# Patient Record
Sex: MALE | Race: BLACK OR AFRICAN AMERICAN
[De-identification: names, ages, dates, MRNs, and addresses within clinical notes are randomized per-mention and may not be internally consistent; named-entity substitution may affect disease eponyms.]

---

## 2017-02-11 ENCOUNTER — HOSPITAL ENCOUNTER (OUTPATIENT)
Dept: HOSPITAL 47 - EC | Age: 47
Setting detail: OBSERVATION
LOS: 1 days | Discharge: LEFT BEFORE DISCHARGE | End: 2017-02-12
Payer: COMMERCIAL

## 2017-02-11 VITALS — BODY MASS INDEX: 46 KG/M2

## 2017-02-11 DIAGNOSIS — I16.0: Primary | ICD-10-CM

## 2017-02-11 DIAGNOSIS — R74.8: ICD-10-CM

## 2017-02-11 DIAGNOSIS — E66.9: ICD-10-CM

## 2017-02-11 DIAGNOSIS — Z79.899: ICD-10-CM

## 2017-02-11 DIAGNOSIS — Z88.5: ICD-10-CM

## 2017-02-11 DIAGNOSIS — I10: ICD-10-CM

## 2017-02-11 DIAGNOSIS — Z87.891: ICD-10-CM

## 2017-02-11 PROCEDURE — 36415 COLL VENOUS BLD VENIPUNCTURE: CPT

## 2017-02-11 PROCEDURE — 83880 ASSAY OF NATRIURETIC PEPTIDE: CPT

## 2017-02-11 PROCEDURE — 85025 COMPLETE CBC W/AUTO DIFF WBC: CPT

## 2017-02-11 PROCEDURE — 83735 ASSAY OF MAGNESIUM: CPT

## 2017-02-11 PROCEDURE — 82553 CREATINE MB FRACTION: CPT

## 2017-02-11 PROCEDURE — 96375 TX/PRO/DX INJ NEW DRUG ADDON: CPT

## 2017-02-11 PROCEDURE — 93005 ELECTROCARDIOGRAM TRACING: CPT

## 2017-02-11 PROCEDURE — 71020: CPT

## 2017-02-11 PROCEDURE — 85730 THROMBOPLASTIN TIME PARTIAL: CPT

## 2017-02-11 PROCEDURE — 82550 ASSAY OF CK (CPK): CPT

## 2017-02-11 PROCEDURE — 96374 THER/PROPH/DIAG INJ IV PUSH: CPT

## 2017-02-11 PROCEDURE — 96376 TX/PRO/DX INJ SAME DRUG ADON: CPT

## 2017-02-11 PROCEDURE — 93306 TTE W/DOPPLER COMPLETE: CPT

## 2017-02-11 PROCEDURE — 99291 CRITICAL CARE FIRST HOUR: CPT

## 2017-02-11 PROCEDURE — 85379 FIBRIN DEGRADATION QUANT: CPT

## 2017-02-11 PROCEDURE — 85610 PROTHROMBIN TIME: CPT

## 2017-02-11 PROCEDURE — 84484 ASSAY OF TROPONIN QUANT: CPT

## 2017-02-11 PROCEDURE — 96361 HYDRATE IV INFUSION ADD-ON: CPT

## 2017-02-11 PROCEDURE — 80053 COMPREHEN METABOLIC PANEL: CPT

## 2017-02-12 VITALS — RESPIRATION RATE: 18 BRPM

## 2017-02-12 VITALS — TEMPERATURE: 98.5 F | HEART RATE: 85 BPM | SYSTOLIC BLOOD PRESSURE: 153 MMHG | DIASTOLIC BLOOD PRESSURE: 83 MMHG

## 2017-02-12 LAB
ALP SERPL-CCNC: 69 U/L (ref 38–126)
ALT SERPL-CCNC: 28 U/L (ref 21–72)
ANION GAP SERPL CALC-SCNC: 10 MMOL/L
APTT BLD: 25.1 SEC (ref 22–30)
AST SERPL-CCNC: 22 U/L (ref 17–59)
BASOPHILS # BLD AUTO: 0 K/UL (ref 0–0.2)
BASOPHILS NFR BLD AUTO: 0 %
BUN SERPL-SCNC: 15 MG/DL (ref 9–20)
CALCIUM SPEC-MCNC: 9.1 MG/DL (ref 8.4–10.2)
CH: 30
CHCM: 35.1
CHLORIDE SERPL-SCNC: 102 MMOL/L (ref 98–107)
CK SERPL-CCNC: 150 U/L (ref 55–170)
CK SERPL-CCNC: 150 U/L (ref 55–170)
CK SERPL-CCNC: 228 U/L (ref 55–170)
CO2 SERPL-SCNC: 28 MMOL/L (ref 22–30)
EOSINOPHIL # BLD AUTO: 0.2 K/UL (ref 0–0.7)
EOSINOPHIL NFR BLD AUTO: 2 %
ERYTHROCYTE [DISTWIDTH] IN BLOOD BY AUTOMATED COUNT: 5.29 M/UL (ref 4.3–5.9)
ERYTHROCYTE [DISTWIDTH] IN BLOOD: 12.7 % (ref 11.5–15.5)
GLUCOSE SERPL-MCNC: 124 MG/DL (ref 74–99)
HCT VFR BLD AUTO: 45.3 % (ref 39–53)
HDW: 2.83
HGB BLD-MCNC: 15.5 GM/DL (ref 13–17.5)
INR PPP: 1 (ref ?–1.1)
LUC NFR BLD AUTO: 2 %
LYMPHOCYTES # SPEC AUTO: 1.5 K/UL (ref 1–4.8)
LYMPHOCYTES NFR SPEC AUTO: 17 %
MAGNESIUM SPEC-SCNC: 2 MG/DL (ref 1.6–2.3)
MCH RBC QN AUTO: 29.2 PG (ref 25–35)
MCHC RBC AUTO-ENTMCNC: 34.2 G/DL (ref 31–37)
MCV RBC AUTO: 85.6 FL (ref 80–100)
MONOCYTES # BLD AUTO: 0.6 K/UL (ref 0–1)
MONOCYTES NFR BLD AUTO: 7 %
NEUTROPHILS # BLD AUTO: 6.5 K/UL (ref 1.3–7.7)
NEUTROPHILS NFR BLD AUTO: 73 %
NON-AFRICAN AMERICAN GFR(MDRD): >60
POTASSIUM SERPL-SCNC: 4.5 MMOL/L (ref 3.5–5.1)
PROT SERPL-MCNC: 7 G/DL (ref 6.3–8.2)
PT BLD: 10.3 SEC (ref 9–12)
SODIUM SERPL-SCNC: 140 MMOL/L (ref 137–145)
TROPONIN I SERPL-MCNC: 0.03 NG/ML (ref 0–0.03)
TROPONIN I SERPL-MCNC: 0.05 NG/ML (ref 0–0.03)
TROPONIN I SERPL-MCNC: 0.05 NG/ML (ref 0–0.03)
WBC # BLD AUTO: 0.15 10*3/UL
WBC # BLD AUTO: 8.9 K/UL (ref 3.8–10.6)
WBC (PEROX): 8.85

## 2017-02-12 RX ADMIN — CEFAZOLIN SCH MLS/HR: 330 INJECTION, POWDER, FOR SOLUTION INTRAMUSCULAR; INTRAVENOUS at 10:18

## 2017-02-12 RX ADMIN — CEFAZOLIN SCH: 330 INJECTION, POWDER, FOR SOLUTION INTRAMUSCULAR; INTRAVENOUS at 14:14

## 2017-02-12 NOTE — HP
DATE OF ADMISSION:  



CHIEF COMPLAINT: A 46-year-old  male admitted with 

some chest tingling and tightness. 



HISTORY OF PRESENT ILLNESS:  A 45-year-old white male with history of 

obesity hypertension, diabetes mellitus and obesity.  His blood 

pressure has been running real high lately. He is up visiting his 

girlfriend up in this area. He is from [facility], felt some chest 

tingling, tightness, at which time he was brought to the hospital.  



FAMILY HISTORY:  He is not sure for heart disease as he was adopted.



Home medicines include:

1. Coreg. 

2. Norco 5/325. 



ALLERGIES: None. 



REVIEW OF SYSTEMS:  A 14-point review of system is negative.



EKG shows sinus tach, no ST depression or elevation. 



Past medical history is hypertension. 



Past surgical history is spinal surgery. 



He is a former smoker, rare alcohol. 



Vital signs were reviewed. 

CARDIOVASCULAR: S1, S2. 

OPHTHALMOLOGIC: Pupils equal, round and reactive to light and 

accommodation.  

PSYCH: Fair mood and affect. 

SKIN: Warm, dry, and intact. 

GI: Distended, obese. No mass or organomegaly. 

CARDIOVASCULAR: Normal rhythm. Normal heart sounds. 

BACK: Normal to inspection, appears in no acute distress. 

NECK: Shows no adenopathy. 

GI:  Normal bowel sounds. 

EXTREMITIES: No inspection. 

NEUROLOGIC: Alert and oriented x3. Cranial nerves are intact. 



Temperature 99.3, pulse is in the 90s to low 100s, respiratory rate 18 

to 16. O2 sat 99% to 95% on room air.   



ASSESSMENT:  Acute dizziness secondary to possible hypertension 

acceleration, left-sided chest pain suggestive of possible heart 

disease.  



PLAN: The patient will be consulted with Cardiology, blood pressure 

control, rule out myocardial infarction. Await chest x-ray.  D-dimer 

are negative.

## 2017-02-12 NOTE — ECHOF
Referral Reason:



MEASUREMENTS

--------

HEIGHT: 167.6 cm

WEIGHT: 129.3 kg

BP: 141/71

IVSd:   1.8 cm     (0.6 - 1.1)

LVIDd:   4.2 cm     (3.9 - 5.3)

LVPWd:   1.6 cm     (0.6 - 1.1)

LVIDs:   2.7 cm

LA Diam:   3.6 cm     (2.7 - 3.8)

RVIDd:   3.9 cm     (< 3.3)

LAESV Index (A-L):   30.05 ml/m

Ao Diam:   4.1 cm     (2.0 - 3.7)

AV Cusp:   2.5 cm     (1.5 - 2.6)

EPSS:   0.3 cm

MV E Evan:   1.20 m/s

MV DecT:   256 ms

MV A Evan:   1.09 m/s

MV E/A Ratio:   1.10 

RAP:   5.00 mmHg

RVSP:   22.14 mmHg

MV EF SLOPE:   78.60 mm/s     (70 - 150)

MV EXCURSION:   18.39 mm     (> 18.000)







FINDINGS

--------

Sinus rhythm.

This was a technically adequate study.

The left ventricular size is normal.   There is severe 

concentric left ventricular hypertrophy.   Overall left 

ventricular systolic function is normal with, an EF 

between 65 - 70 %.

The right ventricle is moderately enlarged.

LA is midly dilated 29-33ml/m2.

The right atrium is normal in size.

The aortic valve is trileaflet and appears structurally 

normal.

Mild mitral regurgitation is present.

Mild tricuspid regurgitation present.   Right 

ventricular systolic pressure is normal at < 35 mmHg.

The pulmonic valve was not well visualized.

The aortic root is dilated measuring 4.1cm.

There is no pericardial effusion.



CONCLUSIONS

--------

1. Sinus rhythm.

2. Mild mitral regurgitation is present.

3. Mild tricuspid regurgitation present.

4. Right ventricular systolic pressure is normal at < 35 

mmHg.

5. The pulmonic valve was not well visualized.

6. The aortic root is dilated measuring 4.1cm.

7. There is no pericardial effusion.

8. This was a technically adequate study.

9. The left ventricular size is normal.

10. There is severe concentric left ventricular hypertrophy.

11. Overall left ventricular systolic function is normal 

with, an EF between 65 - 70 %.

12. The right ventricle is moderately enlarged.

13. LA is midly dilated 29-33ml/m2.

14. The right atrium is normal in size.

15. The aortic valve is trileaflet and appears structurally 

normal.





SONOGRAPHER: Ruth Moreira RDCS

## 2017-02-12 NOTE — DS
DATE OF ADMISSION:   02/12/2017

DATE OF DISCHARGE:   02/12/2017



DISCHARGE MEDICATIONS: 

1. Aspirin 325 mg daily. 

2. Coreg as he takes at home will probably be continued. 



CONDITION: Stable. 



Prognosis is guarded.  The patient could be having a heart attack but 

signed out AGAINST MEDICAL ADVICE.  



HOSPITAL COURSE OF EVENTS: This is a 46-year-old  

obese white male with hypertension acceleration,  blood pressure 

medicine was increased. The patient was admitted because of trending 

up sloping troponin levels for possible non-STEMI. The patient then 

signed out AGAINST MEDICAL ADVICE and just left the hospital and 

stopped all of treatments that he was given.  Even though he 

understood he could be having a heart attack, he signed out AGAINST 

MEDICAL ADVICE.

## 2017-02-12 NOTE — XR
EXAMINATION TYPE: XR chest 2V

 

DATE OF EXAM: 2/12/2017 1:03 AM

 

COMPARISON: NONE

 

HISTORY: Chest pain

 

TECHNIQUE:  Frontal and lateral views of the chest are obtained.

 

FINDINGS:  Heart and mediastinum are normal. Lungs are clear. Diaphragm is normal. Bony thorax is int
act. There are chest leads.

 

IMPRESSION:  Normal chest

## 2017-02-12 NOTE — CONS
DATE OF CONSULTATION:   



CHIEF COMPLAINT: Chest pain and severe uncontrolled hypertension.



HISTORY OF PRESENT ILLNESS:  Mr. Nunez is a 46-year-old gentleman 

with history of hypertension, who presented to hospital with 

palpitations, and severely elevated blood pressure. His blood 

pressures on his initial arrival was over 250 systolic with 160 

diastolic.  He also had vague chest discomfort with it.  EKG shows 

sinus rhythm with ST-T wave changes suggestive of hypertensive heart 

disease. Since admission, he had been treated with labetalol and 

metoprolol with significant improvement in his blood pressure. At the 

time of my evaluation, patient is resting comfortably denies chest 

pain, difficulty in breathing, palpitations, dizziness, or focal 

neurological deficits.  



Past medical history is significant for hypertension. 



Medications at home. He is not very sure, but thinks that he is on 

Norco and Coreg.  



ALLERGIC TO MORPHINE. 



FAMILY HISTORY: Negative for premature coronary artery disease. 



SOCIAL HISTORY: He denies smoking, ETOH abuse, or drug abuse. 



REVIEW OF SYSTEMS:

HEENT: Unremarkable. 

CARDIAC: As described above. 

RESPIRATORY: Negative. 

GI: Negative. 

ALLERGY/IMMUNOLOGY: Negative.

GENITOURINARY: Negative. 

SKIN: Negative. 

ENDOCRINE: Negative. 

DERM: Negative. 

CONSTITUTIONAL: Negative. Oncological: Negative. 

HEMATOLOGICAL: Negative. 



The rest of the system review is not relevant. 



On exam, comfortable at rest, afebrile, heart rate is 84, blood 

pressure 138/72, respiratory  rate 18. There is no jugular venous 

distention. Carotid upstroke is normal. There is no bruit. Chest exam 

reveals good air entry bilaterally. Heart exam reveals first and 

second heart sounds. An S4 is heard.  

ABDOMEN: Soft. Exam of the extremities did not reveal any edema. 

Peripheral pulses are felt. CNS exam did not reveal focal neurological 

deficits.  



Labs show that he has had 2 sets of troponins they are 0.02 and 046, 

046 being slightly elevated. EKG is as described above. Hemoglobin is 

normal at 15.5. Creatinine is 0.8. Potassium is 4.5.  



ASSESSMENT:

1. Severe uncontrolled hypertension. 

2. Chest pain and elevated troponin probably related to uncontrolled 

hypertension with supply demand mismatch.  



PLAN: I will admit him to the hospital, control his blood pressures 

optimally, obtain an echocardiogram and depending upon what happens to 

his troponin, might consider a stress test on him tomorrow morning.  



Thank you for allowing us to participate in the care of this pleasant 

gentleman.

## 2017-02-12 NOTE — ED
Chest Pain HPI





- General


Source: patient, RN notes reviewed


Mode of arrival: ambulatory


Limitations: no limitations





- History of Present Illness


MD Complaint: chest pain, other





<Keaton Nolasco - Last Filed: 02/12/17 00:34>





<Hasmukh Freeman - Last Filed: 02/12/17 02:08>





- General


Chief Complaint: Chest Pain


Stated Complaint: hypertension


Time Seen by Provider: 02/12/17 00:21





- History of Present Illness


Initial Comments: 





This is a 46-year-old male with a history of hypertension who states she hasn't 

been feeling well quite all day he presented today because he was lightheaded 

and some palpitations and some numbness going to his left arm and left chest 

area.  He also states his blood pressure is been running high lately 175 

systolic over an unknown diastolic number he states his doctor is been trying 

to changes medications to get the blood pressure down.  He states he has some 

anterior chest pressure or tightness mild in nature he has no cough no fevers 

chills nausea vomiting sweats no phlegm production no known history of heart 

disease he's not sure of his family history of diseases he is adopted.  He 

denies smoking and only drinks very occasionally.  No trauma is reported. (Keaton Nolasco)





- Related Data


 Home Medications











 Medication  Instructions  Recorded  Confirmed


 


Carvedilol [Coreg]  02/12/17 


 


Hydrocodone/Acetaminophen [Norco 1 tab PO Q6HR PRN 02/12/17 02/12/17





5-325]   











 Allergies











Allergy/AdvReac Type Severity Reaction Status Date / Time


 


No Known Allergies Allergy   Verified 02/12/17 00:09














Review of Systems


ROS Other: All systems not noted in ROS Statement are negative.





<Keaton Nolasco - Last Filed: 02/12/17 00:34>


ROS Other: All systems not noted in ROS Statement are negative.





<Hasmukh Freeman - Last Filed: 02/12/17 02:08>


ROS Statement: 


Those systems with pertinent positive or pertinent negative responses have been 

documented in the HPI.








EKG Findings





- EKG Results:


EKG: interpreted by ERMD, sinus rhythm (Sinus tachycardia rate 122.  Interval 

158 QRS duration 90 QT/QTC of 310/441 possible left atrial enlargement 

nonspecific T-wave configuration and lateral leads.  No acute ST-T wave 

elevation or depressions)





<Keaton Nolasco - Last Filed: 02/12/17 00:34>





Past Medical History


Past Medical History: Hypertension


History of Any Multi-Drug Resistant Organisms: None Reported


Additional Past Surgical History / Comment(s): Spinal surgery


Past Psychological History: No Psychological Hx Reported


Smoking Status: Former smoker


Past Alcohol Use History: Rare


Past Drug Use History: None Reported





<Keaton Nolasco Filed: 02/12/17 00:34>





General Exam


Limitations: no limitations


General appearance: alert, in no apparent distress


Head exam: Present: atraumatic, normocephalic, normal inspection


Eye exam: Present: normal appearance, PERRL, EOMI.  Absent: scleral icterus, 

conjunctival injection, periorbital swelling


ENT exam: Present: normal exam, mucous membranes moist


Neck exam: Present: normal inspection.  Absent: tenderness, meningismus, 

lymphadenopathy


Respiratory exam: Present: normal lung sounds bilaterally.  Absent: respiratory 

distress, wheezes, rales, rhonchi, stridor


Cardiovascular Exam: Present: normal rhythm, tachycardia, normal heart sounds.  

Absent: systolic murmur, diastolic murmur, rubs, gallop, clicks


GI/Abdominal exam: Present: soft, normal bowel sounds.  Absent: distended, 

tenderness, guarding, rebound, rigid


Extremities exam: Present: normal inspection, full ROM, normal capillary 

refill.  Absent: tenderness, pedal edema, joint swelling, calf tenderness


Back exam: Present: normal inspection


Neurological exam: Present: alert, oriented X3, CN II-XII intact


Psychiatric exam: Present: normal affect, normal mood


Skin exam: Present: warm, dry, intact, normal color.  Absent: rash





<Keaton Nolasco Filed: 02/12/17 00:34>


General appearance: alert, in no apparent distress


Head exam: Present: atraumatic, normocephalic, normal inspection


Eye exam: Present: normal appearance, PERRL, EOMI.  Absent: scleral icterus, 

conjunctival injection, periorbital swelling


ENT exam: Present: normal exam, mucous membranes moist


Neck exam: Present: normal inspection.  Absent: tenderness, meningismus, 

lymphadenopathy


Respiratory exam: Present: normal lung sounds bilaterally.  Absent: respiratory 

distress, wheezes, rales, rhonchi, stridor


Cardiovascular Exam: Present: regular rate, normal rhythm, normal heart sounds.

  Absent: systolic murmur, diastolic murmur, rubs, gallop, clicks


GI/Abdominal exam: Present: soft, normal bowel sounds.  Absent: distended, 

tenderness, guarding, rebound, rigid


Extremities exam: Present: normal inspection, full ROM, normal capillary 

refill.  Absent: tenderness, pedal edema, joint swelling, calf tenderness


Back exam: Present: normal inspection


Neurological exam: Present: alert, oriented X3, CN II-XII intact


Psychiatric exam: Present: normal affect, normal mood


Skin exam: Present: warm, dry, intact, normal color.  Absent: rash





<Hasmukh Freeman - Last Filed: 02/12/17 02:08>





- General Exam Comments


Initial Comments: 





This a well-developed well-nourished awake alert oriented 3 male 31/139 (Keaton Nolasco)





Course





<Keaton Nolasco - Last Filed: 02/12/17 00:34>





<Hasmukh Freeman - Last Filed: 02/12/17 02:08>





 Vital Signs











  02/12/17 02/12/17 02/12/17





  00:05 01:24 01:47


 


Temperature 99.3 F  


 


Pulse Rate 125 H 115 H 97


 


Respiratory 20 16 18





Rate   


 


Blood Pressure 250/160 255/133 189/110


 


O2 Sat by Pulse 95 99 95





Oximetry   














- Reevaluation(s)


Reevaluation #1: 





02/12/17 00:34


The patient care was endorsed to Dr. Freeman who will make the final 

disposition. (Keaton Nolasco)


Reevaluation #2: 





02/12/17 02:08


Blood pressure just mildly improved at this point (Hasmukh Freeman)





Chest Pain MDM





<Keaton Nolasco - Last Filed: 02/12/17 00:34>





<Hasmukh Freeman - Last Filed: 02/12/17 02:08>





- MDM





46 had ER for evaluation of high blood pressure, history of high blood pressure 

weakness and dizziness, left-sided chest pain shortness of breath, with 

paresthesias, patient does have elevated precardiac enzymes, no EKG changes.  

Patient be admitted for trending of troponin anticoagulation and blood pressure 

control





Studies, chest x-ray is negative for acute disease (Hasmukh Freeman)





Critical Care Time


Critical Care Time: Yes


Total Critical Care Time: 31





<Hasmukh Freeman - Last Filed: 02/12/17 02:08>





Disposition





<Keaton Nolasco - Last Filed: 02/12/17 00:34>





<Hasmukh Freeman - Last Filed: 02/12/17 02:08>


Clinical Impression: 


 Chest pain, Hypertensive urgency





Disposition: ADMITTED AS IP TO THIS HOSP


Condition: Fair


Referrals: 


Nonstaff,Physician [Primary Care Provider] - 1-2 days

## 2022-07-24 ENCOUNTER — HOSPITAL ENCOUNTER (OUTPATIENT)
Dept: HOSPITAL 47 - EC | Age: 52
Setting detail: OBSERVATION
LOS: 2 days | Discharge: HOME | End: 2022-07-26
Attending: FAMILY MEDICINE | Admitting: FAMILY MEDICINE
Payer: COMMERCIAL

## 2022-07-24 DIAGNOSIS — Z79.82: ICD-10-CM

## 2022-07-24 DIAGNOSIS — E78.5: ICD-10-CM

## 2022-07-24 DIAGNOSIS — E11.9: ICD-10-CM

## 2022-07-24 DIAGNOSIS — I44.7: ICD-10-CM

## 2022-07-24 DIAGNOSIS — Z79.4: ICD-10-CM

## 2022-07-24 DIAGNOSIS — Z86.73: ICD-10-CM

## 2022-07-24 DIAGNOSIS — Z98.890: ICD-10-CM

## 2022-07-24 DIAGNOSIS — Z91.013: ICD-10-CM

## 2022-07-24 DIAGNOSIS — Z79.899: ICD-10-CM

## 2022-07-24 DIAGNOSIS — I16.1: ICD-10-CM

## 2022-07-24 DIAGNOSIS — I25.10: ICD-10-CM

## 2022-07-24 DIAGNOSIS — I21.4: Primary | ICD-10-CM

## 2022-07-24 DIAGNOSIS — I07.1: ICD-10-CM

## 2022-07-24 DIAGNOSIS — I10: ICD-10-CM

## 2022-07-24 DIAGNOSIS — Z79.84: ICD-10-CM

## 2022-07-24 LAB
ALBUMIN SERPL-MCNC: 4 G/DL (ref 3.5–5)
ALP SERPL-CCNC: 79 U/L (ref 38–126)
ALT SERPL-CCNC: 16 U/L (ref 4–49)
ANION GAP SERPL CALC-SCNC: 5 MMOL/L
APTT BLD: 23.5 SEC (ref 22–30)
AST SERPL-CCNC: 18 U/L (ref 17–59)
BASOPHILS # BLD AUTO: 0.1 K/UL (ref 0–0.2)
BASOPHILS # BLD AUTO: 0.1 K/UL (ref 0–0.2)
BASOPHILS NFR BLD AUTO: 1 %
BASOPHILS NFR BLD AUTO: 1 %
BUN SERPL-SCNC: 17 MG/DL (ref 9–20)
CALCIUM SPEC-MCNC: 9 MG/DL (ref 8.4–10.2)
CHLORIDE SERPL-SCNC: 100 MMOL/L (ref 98–107)
CO2 SERPL-SCNC: 31 MMOL/L (ref 22–30)
EOSINOPHIL # BLD AUTO: 0.1 K/UL (ref 0–0.7)
EOSINOPHIL # BLD AUTO: 0.2 K/UL (ref 0–0.7)
EOSINOPHIL NFR BLD AUTO: 2 %
EOSINOPHIL NFR BLD AUTO: 2 %
ERYTHROCYTE [DISTWIDTH] IN BLOOD BY AUTOMATED COUNT: 5.1 M/UL (ref 4.3–5.9)
ERYTHROCYTE [DISTWIDTH] IN BLOOD BY AUTOMATED COUNT: 5.18 M/UL (ref 4.3–5.9)
ERYTHROCYTE [DISTWIDTH] IN BLOOD: 12.8 % (ref 11.5–15.5)
ERYTHROCYTE [DISTWIDTH] IN BLOOD: 12.9 % (ref 11.5–15.5)
GLUCOSE SERPL-MCNC: 256 MG/DL (ref 74–99)
HCT VFR BLD AUTO: 45 % (ref 39–53)
HCT VFR BLD AUTO: 46 % (ref 39–53)
HGB BLD-MCNC: 14.3 GM/DL (ref 13–17.5)
HGB BLD-MCNC: 14.6 GM/DL (ref 13–17.5)
INR PPP: 0.9 (ref ?–1.2)
LYMPHOCYTES # SPEC AUTO: 1.6 K/UL (ref 1–4.8)
LYMPHOCYTES # SPEC AUTO: 1.7 K/UL (ref 1–4.8)
LYMPHOCYTES NFR SPEC AUTO: 19 %
LYMPHOCYTES NFR SPEC AUTO: 19 %
MAGNESIUM SPEC-SCNC: 1.8 MG/DL (ref 1.6–2.3)
MCH RBC QN AUTO: 28.1 PG (ref 25–35)
MCH RBC QN AUTO: 28.2 PG (ref 25–35)
MCHC RBC AUTO-ENTMCNC: 31.8 G/DL (ref 31–37)
MCHC RBC AUTO-ENTMCNC: 31.8 G/DL (ref 31–37)
MCV RBC AUTO: 88.3 FL (ref 80–100)
MCV RBC AUTO: 88.7 FL (ref 80–100)
MONOCYTES # BLD AUTO: 0.6 K/UL (ref 0–1)
MONOCYTES # BLD AUTO: 0.7 K/UL (ref 0–1)
MONOCYTES NFR BLD AUTO: 7 %
MONOCYTES NFR BLD AUTO: 8 %
NEUTROPHILS # BLD AUTO: 5.8 K/UL (ref 1.3–7.7)
NEUTROPHILS # BLD AUTO: 6.2 K/UL (ref 1.3–7.7)
NEUTROPHILS NFR BLD AUTO: 68 %
NEUTROPHILS NFR BLD AUTO: 70 %
PLATELET # BLD AUTO: 203 K/UL (ref 150–450)
PLATELET # BLD AUTO: 209 K/UL (ref 150–450)
POTASSIUM SERPL-SCNC: 4.5 MMOL/L (ref 3.5–5.1)
PROT SERPL-MCNC: 6.8 G/DL (ref 6.3–8.2)
PT BLD: 9.7 SEC (ref 9–12)
SODIUM SERPL-SCNC: 136 MMOL/L (ref 137–145)
WBC # BLD AUTO: 8.5 K/UL (ref 3.8–10.6)
WBC # BLD AUTO: 8.9 K/UL (ref 3.8–10.6)

## 2022-07-24 PROCEDURE — 80048 BASIC METABOLIC PNL TOTAL CA: CPT

## 2022-07-24 PROCEDURE — 84484 ASSAY OF TROPONIN QUANT: CPT

## 2022-07-24 PROCEDURE — 71046 X-RAY EXAM CHEST 2 VIEWS: CPT

## 2022-07-24 PROCEDURE — 83880 ASSAY OF NATRIURETIC PEPTIDE: CPT

## 2022-07-24 PROCEDURE — 36415 COLL VENOUS BLD VENIPUNCTURE: CPT

## 2022-07-24 PROCEDURE — 99291 CRITICAL CARE FIRST HOUR: CPT

## 2022-07-24 PROCEDURE — 96375 TX/PRO/DX INJ NEW DRUG ADDON: CPT

## 2022-07-24 PROCEDURE — 85610 PROTHROMBIN TIME: CPT

## 2022-07-24 PROCEDURE — 83735 ASSAY OF MAGNESIUM: CPT

## 2022-07-24 PROCEDURE — 96365 THER/PROPH/DIAG IV INF INIT: CPT

## 2022-07-24 PROCEDURE — 80053 COMPREHEN METABOLIC PANEL: CPT

## 2022-07-24 PROCEDURE — 85730 THROMBOPLASTIN TIME PARTIAL: CPT

## 2022-07-24 PROCEDURE — 85025 COMPLETE CBC W/AUTO DIFF WBC: CPT

## 2022-07-24 PROCEDURE — 93458 L HRT ARTERY/VENTRICLE ANGIO: CPT

## 2022-07-24 PROCEDURE — 96366 THER/PROPH/DIAG IV INF ADDON: CPT

## 2022-07-24 PROCEDURE — 96376 TX/PRO/DX INJ SAME DRUG ADON: CPT

## 2022-07-24 PROCEDURE — 93005 ELECTROCARDIOGRAM TRACING: CPT

## 2022-07-24 PROCEDURE — 93306 TTE W/DOPPLER COMPLETE: CPT

## 2022-07-24 RX ADMIN — INSULIN DETEMIR SCH UNIT: 100 INJECTION, SOLUTION SUBCUTANEOUS at 22:22

## 2022-07-24 RX ADMIN — HEPARIN SODIUM SCH MLS/HR: 10000 INJECTION, SOLUTION INTRAVENOUS at 21:28

## 2022-07-24 RX ADMIN — Medication SCH MG: at 22:39

## 2022-07-24 NOTE — ED
General Adult HPI





- General


Chief complaint: Chest Pain


Stated complaint: chest pain


Time Seen by Provider: 07/24/22 19:29


Source: EMS, RN notes reviewed, old records reviewed


Mode of arrival: EMS


Limitations: no limitations





- History of Present Illness


Initial comments: 





Patient is a 51-year-old male with past medical history remarkable for 

hypertension on multiple antihypertensive medications who presents emergency 

Department hypertensive complaining of chest pain.  States his chest pain began 

approximate an hour to an hour and half prior to arrival.  Describes it as 

substernal and pressure-like.  Denies any shortness of breath.  Denies worsening

lower extremity edema.  Denies abdominal pain, nausea, vomiting.  States he does

get chest pain like this was pressures are high, however this one seems a little

more intense.  States his pressure is also higher than normal.  States his blood

pressure usually runs anywhere with the top number from 180-200.  He currently 

is 220s over 140s.  Presents for further evaluation at this time.  Is not on 

blood thinners.  No history of stents.  No history of CAD as far as he knows.  

Discussed the pain as 10 out of 10, nonradiating.





- Related Data


                                Home Medications











 Medication  Instructions  Recorded  Confirmed


 


Atorvastatin Calcium [Lipitor] 40 mg PO DAILY 07/24/22 07/24/22


 


Cholecalciferol [Vitamin D3 (25 50 mcg PO DAILY 07/24/22 07/24/22





Mcg = 1000 Iu)]   


 


Ezetimibe [Zetia] 10 mg PO DAILY 07/24/22 07/24/22


 


Furosemide [Lasix] 40 mg PO BID@0800,1300 07/24/22 07/24/22


 


Insulin Glargine,Hum.rec.anlog 20 units SQ HS 07/24/22 07/24/22





[Lantus Solostar Pen]   


 


Insulin Lispro [humaLOG Kwikpen] 15 unit SQ TID-W/MEALS 07/24/22 07/24/22


 


Isosorbide Mononitrate ER [Imdur] 30 mg PO DAILY 07/24/22 07/24/22


 


Losartan/Hydrochlorothiazide 1 tab PO DAILY 07/24/22 07/24/22





[Hyzaar 100-25 Tablet]   


 


Omega-3/Dha/Epa/Fish Oil [Fish Oil 1 cap PO DAILY 07/24/22 07/24/22





1,000 mg Softgel]   


 


amLODIPine [Norvasc] 10 mg PO DAILY 07/24/22 07/24/22


 


carvediloL [Coreg] 25 mg PO BID 07/24/22 07/24/22


 


hydrALAZINE HCL [Apresoline] 25 mg PO TID 07/24/22 07/24/22


 


hydroCHLOROthiazide 25 mg PO DAILY 07/24/22 07/24/22


 


metFORMIN HCL [Glucophage] 1,000 mg PO AC-BID@1300,1800 07/24/22 07/24/22


 


tiZANidine [Zanaflex] 4 mg PO BID 07/24/22 07/24/22











                                    Allergies











Allergy/AdvReac Type Severity Reaction Status Date / Time


 


shellfish derived [Shellfish] Allergy  Anaphylaxis Verified 07/24/22 21:08














Review of Systems


ROS Statement: 


Those systems with pertinent positive or pertinent negative responses have been 

documented in the HPI.


Review of Systems:


CONST: Denies fever 


EYES: Denies blurry vision 


ENT: Denies nasal congestion  


C/V: Endorses chest pain


RESP: Denies shortness of breath 


GI: Denies abdominal pain 


: Denies dysuria  


SKIN: Denies rash.


MSK: Denies joint pain.


NEURO: Denies headache 


ROS Other: All systems not noted in ROS Statement are negative.





Past Medical History


Past Medical History: Hypertension


History of Any Multi-Drug Resistant Organisms: None Reported


Additional Past Surgical History / Comment(s): Spinal surgery on L4-5 with screw

, pins and cage


Past Anesthesia/Blood Transfusion Reactions: No Reported Reaction


Past Psychological History: No Psychological Hx Reported


Smoking Status: Never smoker


Past Alcohol Use History: Rare


Past Drug Use History: None Reported





- Past Family History


  ** Father


Additional Family Medical History / Comment(s): pt adopted does not know family 

hx





General Exam





- General Exam Comments


Initial Comments: 





General: Appears in no acute distress.


HEAD:  Normal with no signs of head trauma.


EYES:  PERRLA, EOMI, conjunctiva normal, no discharge.


ENT:  Hearing grossly intact, normal oropharynx.


RESPIRATORY:  Clear breath sounds bilaterally.  No wheezes, rales, or rhonchi.  


C/V: Good cardiac with regular rhythm.  S1 and S2 auscultated.  Peripheral 

pulses 2+ intact throughout.  1+ pitting edema bilateral lower extremities.


ABD:  Abd is soft, nontender, nondistended


EXT: Normal range of motion, no obvious deformity


SKIN:  No rashes or lesions observed on exposed skin.


NEURO: Alert and Oriented 4.


Limitations: no limitations





Course


                                   Vital Signs











  07/24/22 07/24/22 07/24/22





  19:08 19:11 19:51


 


Temperature 99.1 F  


 


Pulse Rate 113 H  98


 


Pulse Rate [  115 H 





Cardiac Monitor   





]   


 


Respiratory 18  16





Rate   


 


Blood Pressure 224/143  200/107


 


O2 Sat by Pulse 96  95





Oximetry   














  07/24/22





  20:18


 


Temperature 


 


Pulse Rate 98


 


Pulse Rate [ 





Cardiac Monitor 





] 


 


Respiratory 17





Rate 


 


Blood Pressure 194/121


 


O2 Sat by Pulse 95





Oximetry 














Medical Decision Making





- Medical Decision Making





7 patient's presentation and physical exam, I'm concerned for possible acute 

cardiopulmonary etiology for his current symptoms.  Patient hypertensive 

emergency at this time as well.  Will be given a dose of IV labetalol.  Already 

received aspirin from EMS.  We'll obtain cardiac labs, chest x-ray, EKG.  He was

in agreement this plan.





EKG shows no signs of acute ischemia.  There is chronic left bundle branch 

morphology with J-point elevation that is unchanged for prior EKGs.  Laboratory 

studies are remarkable for a mildly elevated troponin of 0.056.  BNP is within 

normal limits.  Chest x-ray shows no acute cardiopulmonary process.





On reevaluation, patient's chest pain has decreased following multiple doses of 

labetalol.  Blood pressure is also improved at this time with systolics in the 

180s.  We'll attempt a nitroglycerin tablet at this time to see if there is any 

improvement in his pain.





Patient was initially nitroglycerin tablet, there was mild improvement in 

symptoms, stating it is now a 6 or 7 down 1 or 2 points and his pain scale.  

States he seems to be feeling better and is back down towards his normal chronic

chest discomfort.  Will be placed on Nitropaste as he is somewhat normotensive 

for himself and I do not want to drop his blood pressure any further.  We'll 

continue to monitor blood pressure.  Patient already received aspirin.  Due to 

his cardiac history as well as his typical type chest pain and elevated 

troponin, we will start the patient on heparin for non-STEMI.  Is likely 

secondary to his hypertensive emergency.  Patient was in agreement this plan.





Troponins will be trended.  Echo was ordered.  I spoke with the admitting 

physician, city call Dr. Perdue who accepted the patient.  I paged cardiology 

to notify them of the consult.  





- Lab Data


Result diagrams: 


                                 07/24/22 19:18





                                 07/24/22 19:18


                                   Lab Results











  07/24/22 07/24/22 07/24/22 Range/Units





  19:18 19:18 19:18 


 


WBC  8.5    (3.8-10.6)  k/uL


 


RBC  5.18    (4.30-5.90)  m/uL


 


Hgb  14.6    (13.0-17.5)  gm/dL


 


Hct  46.0    (39.0-53.0)  %


 


MCV  88.7    (80.0-100.0)  fL


 


MCH  28.2    (25.0-35.0)  pg


 


MCHC  31.8    (31.0-37.0)  g/dL


 


RDW  12.9    (11.5-15.5)  %


 


Plt Count  209    (150-450)  k/uL


 


MPV  8.7    


 


Neutrophils %  68    %


 


Lymphocytes %  19    %


 


Monocytes %  8    %


 


Eosinophils %  2    %


 


Basophils %  1    %


 


Neutrophils #  5.8    (1.3-7.7)  k/uL


 


Lymphocytes #  1.6    (1.0-4.8)  k/uL


 


Monocytes #  0.7    (0-1.0)  k/uL


 


Eosinophils #  0.1    (0-0.7)  k/uL


 


Basophils #  0.1    (0-0.2)  k/uL


 


PT   9.7   (9.0-12.0)  sec


 


INR   0.9   (<1.2)  


 


APTT   23.5   (22.0-30.0)  sec


 


Sodium    136 L  (137-145)  mmol/L


 


Potassium    4.5  (3.5-5.1)  mmol/L


 


Chloride    100  ()  mmol/L


 


Carbon Dioxide    31 H  (22-30)  mmol/L


 


Anion Gap    5  mmol/L


 


BUN    17  (9-20)  mg/dL


 


Creatinine    0.97  (0.66-1.25)  mg/dL


 


Est GFR (CKD-EPI)AfAm    >90  (>60 ml/min/1.73 sqM)  


 


Est GFR (CKD-EPI)NonAf    >90  (>60 ml/min/1.73 sqM)  


 


Glucose    256 H  (74-99)  mg/dL


 


Calcium    9.0  (8.4-10.2)  mg/dL


 


Magnesium    1.8  (1.6-2.3)  mg/dL


 


Total Bilirubin    0.4  (0.2-1.3)  mg/dL


 


AST    18  (17-59)  U/L


 


ALT    16  (4-49)  U/L


 


Alkaline Phosphatase    79  ()  U/L


 


Troponin I     (0.000-0.034)  ng/mL


 


NT-Pro-B Natriuret Pep     pg/mL


 


Total Protein    6.8  (6.3-8.2)  g/dL


 


Albumin    4.0  (3.5-5.0)  g/dL














  07/24/22 07/24/22 Range/Units





  19:18 19:18 


 


WBC    (3.8-10.6)  k/uL


 


RBC    (4.30-5.90)  m/uL


 


Hgb    (13.0-17.5)  gm/dL


 


Hct    (39.0-53.0)  %


 


MCV    (80.0-100.0)  fL


 


MCH    (25.0-35.0)  pg


 


MCHC    (31.0-37.0)  g/dL


 


RDW    (11.5-15.5)  %


 


Plt Count    (150-450)  k/uL


 


MPV    


 


Neutrophils %    %


 


Lymphocytes %    %


 


Monocytes %    %


 


Eosinophils %    %


 


Basophils %    %


 


Neutrophils #    (1.3-7.7)  k/uL


 


Lymphocytes #    (1.0-4.8)  k/uL


 


Monocytes #    (0-1.0)  k/uL


 


Eosinophils #    (0-0.7)  k/uL


 


Basophils #    (0-0.2)  k/uL


 


PT    (9.0-12.0)  sec


 


INR    (<1.2)  


 


APTT    (22.0-30.0)  sec


 


Sodium    (137-145)  mmol/L


 


Potassium    (3.5-5.1)  mmol/L


 


Chloride    ()  mmol/L


 


Carbon Dioxide    (22-30)  mmol/L


 


Anion Gap    mmol/L


 


BUN    (9-20)  mg/dL


 


Creatinine    (0.66-1.25)  mg/dL


 


Est GFR (CKD-EPI)AfAm    (>60 ml/min/1.73 sqM)  


 


Est GFR (CKD-EPI)NonAf    (>60 ml/min/1.73 sqM)  


 


Glucose    (74-99)  mg/dL


 


Calcium    (8.4-10.2)  mg/dL


 


Magnesium    (1.6-2.3)  mg/dL


 


Total Bilirubin    (0.2-1.3)  mg/dL


 


AST    (17-59)  U/L


 


ALT    (4-49)  U/L


 


Alkaline Phosphatase    ()  U/L


 


Troponin I  0.056 H*   (0.000-0.034)  ng/mL


 


NT-Pro-B Natriuret Pep   398  pg/mL


 


Total Protein    (6.3-8.2)  g/dL


 


Albumin    (3.5-5.0)  g/dL














- EKG Data


-: EKG Interpreted by Me


EKG Comments: 





12-lead Electrocardiogram Interpretation Note





EKG was reviewed and interpreted by myself. 12-lead ECG performed at 1903 is 

interpreted by me as revealing sinus tachycardia at a rate of 116 beats per 

minute.  Axis is normal.  MS interval is 184 ms, QRS duration is 97 ms, QTc is 

388 ms.  There were no acute ST or T wave abnormalities to suggest myocardial 

ischemia or injury.  Chronic left bundle branch block morphology in the 

precordial leads with J-point elevation and unchanged from prior.  R wave 

progression across the precordium was satisfactory. By my interpretation this 

EKG is non-diagnostic for acute ischemia.  Unchanged when compared to prior EKGs

from February 2017.





Critical Care Time


Critical Care Time: Yes


Total Critical Care Time: 35


Critical Care Time: 





Upon my evaluation, this patient had a high probability of imminent or life-

threatening deterioration due to hypertensive emergency, NSTEMI, heparin 

initiation, which required my direct attention, intervention, and personal manag

ement. 


I have personally provided 35 minutes of critical care time exclusive of time 

spent on separately billable procedures. Time includes review of laboratory 

data, radiology results, discussion with consultants, and monitoring for 

potential decompensation. Interventions were performed as documented in my note.





Disposition


Clinical Impression: 


 NSTEMI (non-ST elevated myocardial infarction), Hypertensive emergency





Disposition: ADMITTED AS IP TO THIS HOSP


Condition: Stable


Referrals: 


None,Stated [Primary Care Provider] - 1-2 days


Time of Disposition: 21:10

## 2022-07-24 NOTE — XR
EXAMINATION TYPE: XR chest 2V

 

DATE OF EXAM: 7/24/2022 8:52 PM

 

COMPARISON: Chest radiographs from 7/12/2017.

 

TECHNIQUE: XR chest 2V Frontal and lateral views of the chest.

 

CLINICAL INDICATION:Male, 51 years old with history of Chest Pain; 

 

FINDINGS: 

Lungs/Pleura: There is no evidence of pleural effusion, focal consolidation, or pneumothorax.  

Pulmonary vascularity: Unremarkable.

Heart/mediastinum: Cardiomediastinal silhouette is prominent in size.

Musculoskeletal: No acute osseous pathology.

 

IMPRESSION: 

No acute cardiopulmonary disease/process.

## 2022-07-25 LAB
ANION GAP SERPL CALC-SCNC: 0 MMOL/L
APTT BLD: 26.5 SEC (ref 22–30)
BASOPHILS # BLD AUTO: 0 K/UL (ref 0–0.2)
BASOPHILS NFR BLD AUTO: 1 %
BUN SERPL-SCNC: 18 MG/DL (ref 9–20)
CALCIUM SPEC-MCNC: 8.4 MG/DL (ref 8.4–10.2)
CHLORIDE SERPL-SCNC: 101 MMOL/L (ref 98–107)
CO2 SERPL-SCNC: 34 MMOL/L (ref 22–30)
EOSINOPHIL # BLD AUTO: 0.2 K/UL (ref 0–0.7)
EOSINOPHIL NFR BLD AUTO: 2 %
ERYTHROCYTE [DISTWIDTH] IN BLOOD BY AUTOMATED COUNT: 4.54 M/UL (ref 4.3–5.9)
ERYTHROCYTE [DISTWIDTH] IN BLOOD: 13 % (ref 11.5–15.5)
GLUCOSE BLD-MCNC: 121 MG/DL (ref 70–110)
GLUCOSE BLD-MCNC: 143 MG/DL (ref 70–110)
GLUCOSE BLD-MCNC: 151 MG/DL (ref 70–110)
GLUCOSE BLD-MCNC: 182 MG/DL (ref 70–110)
GLUCOSE SERPL-MCNC: 234 MG/DL (ref 74–99)
HCT VFR BLD AUTO: 40.7 % (ref 39–53)
HGB BLD-MCNC: 13.4 GM/DL (ref 13–17.5)
INR PPP: 0.9 (ref ?–1.2)
LYMPHOCYTES # SPEC AUTO: 1.5 K/UL (ref 1–4.8)
LYMPHOCYTES NFR SPEC AUTO: 21 %
MCH RBC QN AUTO: 29.4 PG (ref 25–35)
MCHC RBC AUTO-ENTMCNC: 32.8 G/DL (ref 31–37)
MCV RBC AUTO: 89.7 FL (ref 80–100)
MONOCYTES # BLD AUTO: 0.5 K/UL (ref 0–1)
MONOCYTES NFR BLD AUTO: 7 %
NEUTROPHILS # BLD AUTO: 4.6 K/UL (ref 1.3–7.7)
NEUTROPHILS NFR BLD AUTO: 67 %
PLATELET # BLD AUTO: 178 K/UL (ref 150–450)
POTASSIUM SERPL-SCNC: 4 MMOL/L (ref 3.5–5.1)
PT BLD: 9.9 SEC (ref 9–12)
SODIUM SERPL-SCNC: 135 MMOL/L (ref 137–145)
WBC # BLD AUTO: 6.9 K/UL (ref 3.8–10.6)

## 2022-07-25 PROCEDURE — B2111ZZ FLUOROSCOPY OF MULTIPLE CORONARY ARTERIES USING LOW OSMOLAR CONTRAST: ICD-10-PCS

## 2022-07-25 PROCEDURE — 4A023N7 MEASUREMENT OF CARDIAC SAMPLING AND PRESSURE, LEFT HEART, PERCUTANEOUS APPROACH: ICD-10-PCS

## 2022-07-25 RX ADMIN — ATORVASTATIN CALCIUM SCH MG: 40 TABLET, FILM COATED ORAL at 09:01

## 2022-07-25 RX ADMIN — INSULIN DETEMIR SCH UNIT: 100 INJECTION, SOLUTION SUBCUTANEOUS at 20:50

## 2022-07-25 RX ADMIN — EZETIMIBE SCH MG: 10 TABLET ORAL at 09:02

## 2022-07-25 RX ADMIN — FUROSEMIDE SCH MG: 40 TABLET ORAL at 09:00

## 2022-07-25 RX ADMIN — INSULIN ASPART SCH UNIT: 100 INJECTION, SOLUTION INTRAVENOUS; SUBCUTANEOUS at 06:43

## 2022-07-25 RX ADMIN — LOSARTAN POTASSIUM AND HYDROCHLOROTHIAZIDE SCH EACH: 12.5; 5 TABLET ORAL at 09:21

## 2022-07-25 RX ADMIN — HEPARIN SODIUM SCH: 10000 INJECTION, SOLUTION INTRAVENOUS at 22:40

## 2022-07-25 RX ADMIN — LOSARTAN POTASSIUM SCH MG: 50 TABLET, FILM COATED ORAL at 09:02

## 2022-07-25 RX ADMIN — Medication SCH: at 09:01

## 2022-07-25 RX ADMIN — INSULIN ASPART SCH UNIT: 100 INJECTION, SOLUTION INTRAVENOUS; SUBCUTANEOUS at 18:31

## 2022-07-25 RX ADMIN — INSULIN ASPART SCH: 100 INJECTION, SOLUTION INTRAVENOUS; SUBCUTANEOUS at 11:52

## 2022-07-25 RX ADMIN — Medication SCH MG: at 20:49

## 2022-07-25 RX ADMIN — FUROSEMIDE SCH: 40 TABLET ORAL at 11:59

## 2022-07-25 RX ADMIN — ISOSORBIDE MONONITRATE SCH MG: 30 TABLET, EXTENDED RELEASE ORAL at 09:02

## 2022-07-25 RX ADMIN — CEFAZOLIN SCH MLS/HR: 330 INJECTION, POWDER, FOR SOLUTION INTRAMUSCULAR; INTRAVENOUS at 09:00

## 2022-07-25 NOTE — CC
CARDIAC CATHETERIZATION REPORT



INDICATION:

Non ST segment elevation MI.



PROCEDURE NOTE:

After obtaining informed consent, left heart catheterization and coronary angiogram

were performed via the right radial artery using standard Jeanne catheters.  Patient

tolerated the procedure well without any obvious immediate complications.



The patient received moderate conscious sedation.  Total sedation time was 17 minutes.

He will have hemostasis by a TR band.



The patient received intravenous heparin and 5 mg of verapamil per protocol.



The right radial artery access was obtained using a micropuncture needle with Seldinger

technique. Catheters and wires were floated into the ascending aorta under fluoroscopic

guidance.



FINDINGS:



HEMODYNAMICS:

Left ventricular end-diastolic pressure is 20 mm.  There is no significant gradient

across the aortic valve.



LEFT VENTRICULOGRAM:

Not performed.



ANGIOGRAPHIC DATA:

RIGHT CORONARY ARTERY:  Right coronary artery is a large dominant vessel and is free of

significant stenosis.



LEFT MAIN: Left main coronary artery is a normal-sized vessel and is free of stenosis.

Divides into left anterior descending coronary artery and circumflex coronary artery.



LEFT ANTERIOR DESCENDING CORONARY ARTERY: LAD shows a moderate atherosclerotic plaque

right at the origin of the diagonal branch.  It seems to be 50-60% stenosis.  The

second OM branch shows a focal 90% stenosis, but distal to the stenosis, there is a

very small vessel left.



CONCLUSIONS:

Two-vessel coronary artery disease as described above with a noncritical disease

involving LAD and severe disease involving OM branch very distantly.  I am going to

review the angiographic data with the available interventionist. My plan at this stage

is to treat him with optimal medical therapy, work on aggressive blood pressure control

and obtain an outpatient stress test.  If he has ischemia in LAD distribution, we will

consider revascularization.  I do not see the point of doing angioplasty of the second

OM branch as distal to the stenosis there is very small territory that the vessel is

actually supplying. In any event, patient's symptoms were in the setting of severe

uncontrolled hypertension.





MMODL / IJN: [id number] / Job#: [id number]

## 2022-07-25 NOTE — HP
HISTORY AND PHYSICAL



51-year-old white male coming with hypertension with systolic over 220 over the past

few days.  He came to the hospital with chest pressure, substernal pressure, slightly

bumped troponin and hypertension acceleration and urgency in the Emergency,  220s/140s.

He was placed on nitro paste.  There is no history of coronary artery disease with

stents.  Pain is 10/10 and nonradiating.



Home medications include: Zetia and Lipitor, Lantus for insulin.  He is on Imdur 30

daily, losartan hydrochlorothiazide 100/25 1 daily, Norvasc 10 mg daily, Coreg 25

b.i.d., hydralazine 25 t.i.d., hydrochlorothiazide 25 daily, metformin 1000 b.i.d.,

Zanaflex 4 mg b.i.d.



REVIEW OF SYSTEM:

14 point review of systems otherwise is negative.



FAMILY HISTORY:

He is adopted.



PHYSICAL EXAMINATION:

Vital signs stable.

LUNGS:  Clear.

CARDIAC S1, S2.

ABDOMEN:  Soft.

EXTREMITIES:  No edema.

PSYCH: Fair mood and affect.

NEUROLOGIC:  Alert and oriented x3.

Blood pressure on admission 194/121, respiratory 16 to 18, temp 98, pulse ox 95.



ASSESSMENT AND PLAN:

1. Hypertensive emergency.  Placed on nitro paste.

2. Put him on his home medications.

3. EKG shows chronic bundle branch block.  No ST changes.  Cardiology has been.

4. History of multiple doses of labetalol in the ER.

5. Blood pressure will have to be controlled.

6. Wait for the heart doctor to see if he he is going to heart cath him, etc.

7. Prognosis guarded.





MMODL / IJN: [id number] / Job#: [id number]

## 2022-07-26 VITALS — DIASTOLIC BLOOD PRESSURE: 79 MMHG | TEMPERATURE: 98 F | HEART RATE: 69 BPM | SYSTOLIC BLOOD PRESSURE: 131 MMHG

## 2022-07-26 VITALS — RESPIRATION RATE: 17 BRPM

## 2022-07-26 LAB
ANION GAP SERPL CALC-SCNC: 4 MMOL/L
BUN SERPL-SCNC: 14 MG/DL (ref 9–20)
CALCIUM SPEC-MCNC: 8.5 MG/DL (ref 8.4–10.2)
CHLORIDE SERPL-SCNC: 101 MMOL/L (ref 98–107)
CO2 SERPL-SCNC: 32 MMOL/L (ref 22–30)
GLUCOSE BLD-MCNC: 130 MG/DL (ref 70–110)
GLUCOSE BLD-MCNC: 149 MG/DL (ref 70–110)
GLUCOSE BLD-MCNC: 92 MG/DL (ref 70–110)
GLUCOSE SERPL-MCNC: 129 MG/DL (ref 74–99)
POTASSIUM SERPL-SCNC: 4.6 MMOL/L (ref 3.5–5.1)
SODIUM SERPL-SCNC: 137 MMOL/L (ref 137–145)

## 2022-07-26 RX ADMIN — ISOSORBIDE MONONITRATE SCH MG: 30 TABLET, EXTENDED RELEASE ORAL at 08:22

## 2022-07-26 RX ADMIN — LOSARTAN POTASSIUM AND HYDROCHLOROTHIAZIDE SCH EACH: 12.5; 5 TABLET ORAL at 08:22

## 2022-07-26 RX ADMIN — LOSARTAN POTASSIUM SCH MG: 50 TABLET, FILM COATED ORAL at 08:22

## 2022-07-26 RX ADMIN — INSULIN ASPART SCH: 100 INJECTION, SOLUTION INTRAVENOUS; SUBCUTANEOUS at 12:07

## 2022-07-26 RX ADMIN — INSULIN ASPART SCH UNIT: 100 INJECTION, SOLUTION INTRAVENOUS; SUBCUTANEOUS at 12:10

## 2022-07-26 RX ADMIN — INSULIN ASPART SCH: 100 INJECTION, SOLUTION INTRAVENOUS; SUBCUTANEOUS at 16:42

## 2022-07-26 RX ADMIN — CEFAZOLIN SCH: 330 INJECTION, POWDER, FOR SOLUTION INTRAMUSCULAR; INTRAVENOUS at 07:02

## 2022-07-26 RX ADMIN — CEFAZOLIN SCH MLS/HR: 330 INJECTION, POWDER, FOR SOLUTION INTRAMUSCULAR; INTRAVENOUS at 00:10

## 2022-07-26 RX ADMIN — EZETIMIBE SCH MG: 10 TABLET ORAL at 08:22

## 2022-07-26 RX ADMIN — Medication SCH MCG: at 12:10

## 2022-07-26 RX ADMIN — FUROSEMIDE SCH MG: 40 TABLET ORAL at 12:10

## 2022-07-26 RX ADMIN — FUROSEMIDE SCH MG: 40 TABLET ORAL at 08:22

## 2022-07-26 RX ADMIN — ATORVASTATIN CALCIUM SCH MG: 40 TABLET, FILM COATED ORAL at 08:22

## 2022-07-26 NOTE — P.PN
Subjective


This is a 51 year old male with a past medical history of type 2 diabetes, 

hypertension, dyslipidemia.  He does not follow a cardiologist.  We have been 

asked to see patient for chest pain. Patient presented to the ER with complaints

of chest discomfort. Her troponins were elevated mildly at 0.05 x 3. She was 

hypertensive with BP 200s/100s. EKG revealed sinus tachycardia with left bundle 

branch block with similar abnormalities from prior EKG. Patient was evaluated by

Dr. Castaneda and cardiac catheterization was recommended. 





Echocardiogram revealed EF of 5560 %, concentric LVH.





Cardiac catheterization on 7/25/2022 revealed, RCA free of significant stenosis,

left main free of significant stenosis.  LAD shows a moderate atherosclerotic 

plaque right at the origin of the diagonal branch, 50-60% stenosis.  Second 

diagonal branch has a focal 90% stenosis but distal to the stenosis there is a 

very small vessel.  Medical therapy was recommended at that time. 





7/26/2022


Patient seen and examined at bedside, no acute distress. Denies any chest pain. 

Shortness of breath is stable. Vital signs are stable.  Blood pressure is 

improved.  Slightly elevated this morning prior to medications.





Meds: Aspirin 81 mg daily, atorvastatin 40 mg daily, coreg 25 mg twice a day, 

Zetia 10 mg daily, Lasix 40 mg twice a day, hydralazine 25 mg 3 times a day, 

hydrochlorothiazide 25 mg daily, Imdur 30 mg daily, Losartan- Hctz 100-25mg 

Daily 





Blood pressure 160/105, heart rate 81, afebrile, saturation 97% room air


GENERAL: in no acute distress.


NECK: Supple without JVD or thyromegaly.


LUNGS: Breath sounds clear to auscultation bilaterally. Respiration equal and 

unlabored.  No wheezes, rales or rhonchi.


HEART: Regular rate and rhythm without murmurs, rubs or gallops. S1 and S2 

heard.


EXTREMITIES: Normal range of motion, no edema.  No clubbing or cyanosis. 

Peripheral pulses intact.


SKIN: Right radial cath site, clean 2+ pulses, some swelling and bruising noted.

No hematoma 





ASSESSMENT


NSTEMI 


Coronary artery disease with 50-60% LAD stenosis, Second diagonal branch has a 

focal 90% stenosis but distal to the stenosis there is a very small vessel


Hypertensive emergency 


Hypertension


Type 2 Diabetes


Dyslipidemia





PLAN


From a cardiology perspective, patient can be discharged home.  Plan to treat 

with optimal medical therapy, blood pressure control.  Plan for outpatient 

stress test.


Start Plavix 75mg daily


Increase hydralazine to 50 mg TID


Continue all other cardiac medications


Follow-up in the office in one week





Nurse Practitioner note has been reviewed, I agree with a documented findings 

and plan of care.  Patient was seen and examined.








Objective





- Vital Signs


Vital signs: 


                                   Vital Signs











Temp  98.2 F   07/25/22 11:52


 


Pulse  78   07/25/22 11:52


 


Resp  17   07/25/22 11:52


 


BP  149/89   07/25/22 11:52


 


Pulse Ox  93 L  07/25/22 11:52


 


FiO2      








                                 Intake & Output











 07/24/22 07/25/22 07/25/22





 18:59 06:59 18:59


 


Intake Total  57.512 240


 


Balance  57.512 240


 


Weight  136.078 kg 


 


Intake:   


 


  Intake, IV Titration  57.512 





  Amount   


 


    Heparin Sod,Pork in 0.45%  57.512 





    NaCl 25,000 unit In 0.45   





    % NaCl 1 250ml.bag @ 7.35   





    UNITS/KG/HR 10.002 mls/   





    hr IV .Q24H [facility] Rx#:   





    [id number]   


 


  Oral   240


 


Other:   


 


  # Voids  2 1


 


  # Bowel Movements   1














- Labs


CBC & Chem 7: 


                                 07/25/22 02:42





                                 07/26/22 07:19


Labs: 


                  Abnormal Lab Results - Last 24 Hours (Table)











  07/24/22 07/24/22 07/24/22 Range/Units





  19:18 19:18 23:09 


 


APTT     (22.0-30.0)  sec


 


Sodium  136 L    (137-145)  mmol/L


 


Carbon Dioxide  31 H    (22-30)  mmol/L


 


Glucose  256 H    (74-99)  mg/dL


 


POC Glucose (mg/dL)     ()  mg/dL


 


Troponin I   0.056 H*  0.057 H*  (0.000-0.034)  ng/mL














  07/25/22 07/25/22 07/25/22 Range/Units





  02:42 02:42 06:02 


 


APTT     (22.0-30.0)  sec


 


Sodium   135 L   (137-145)  mmol/L


 


Carbon Dioxide   34 H   (22-30)  mmol/L


 


Glucose   234 H   (74-99)  mg/dL


 


POC Glucose (mg/dL)    151 H  ()  mg/dL


 


Troponin I  0.059 H*    (0.000-0.034)  ng/mL














  07/25/22 07/25/22 Range/Units





  08:30 11:24 


 


APTT  39.3 H   (22.0-30.0)  sec


 


Sodium    (137-145)  mmol/L


 


Carbon Dioxide    (22-30)  mmol/L


 


Glucose    (74-99)  mg/dL


 


POC Glucose (mg/dL)   121 H  ()  mg/dL


 


Troponin I    (0.000-0.034)  ng/mL

## 2022-07-26 NOTE — CA
Transthoracic Echo Report 

 Name: Suhas Nunez 

 MRN:    [id number] 

 Age:    51     Gender:     M 

 

 :    1970 

 Exam Date:     2022 08:37 

 Exam Location: [facility] 

 Ht (in):     66     Wt (lb):     300 

 Ordering Physician:        Slick Guerra MD 

 Attending/Referring Phys: 

 Technician         Kayley Scott RDCS 

 Procedure CPT: 

 Indications:       nstemi 

 

 Cardiac Hx: 

 Technical Quality:      Technically difficult study 

 Contrast 1:    Lumason                     Total Dose (mL):      4 

 Contrast 2:                                Total Dose (mL): 

 

 MEASUREMENTS  (Male / Female) Normal Values 

 2D ECHO 

 LV Diastolic Diameter PLAX        4.1 cm                4.2 - 5.9 / 3.9 - 5.3 cm 

 LV Systolic Diameter PLAX         2.1 cm                 

 IVS Diastolic Thickness           2.2 cm                0.6 - 1.0 / 0.6 - 0.9 cm 

 LVPW Diastolic Thickness          2.3 cm                0.6 - 1.0 / 0.6 - 0.9 cm 

 LV Relative Wall Thickness        1.1                    

 LA Volume                         71.6 cm???              18 - 58 / 22 - 52 cm??? 

 

 M-MODE 

 Aortic Root Diameter MM           3.0 cm                 

 LA Systolic Diameter MM           4.7 cm                 

 LA Ao Ratio MM                    1.6                    

 AV Cusp Separation MM             2.2 cm                 

 

 DOPPLER 

 AV Peak Velocity                  174.5 cm/s             

 AV Peak Gradient                  12.2 mmHg              

 LVOT Peak Velocity                136.3 cm/s             

 LVOT Peak Gradient                7.4 mmHg               

 MV Area PHT                       6.0 cm???                

 Mitral E Point Velocity           97.5 cm/s              

 Mitral A Point Velocity           123.4 cm/s             

 Mitral E to A Ratio               0.8                    

 MV Deceleration Time              125.5 ms               

 

 

 FINDINGS 

 Left Ventricle 

 Severely increased left ventricular wall thickness. Normal left ventricular  

 systolic function with no obvious regional wall motion abnormalities. Left  

 ventricular ejection fraction is estimated at 55-60 %. 

 

 Right Ventricle 

 Normal right ventricular size and function. 

 

 Right Atrium 

 Normal right atrial size. 

 

 Left Atrium 

 Moderate left atrial dilatation. No evidence for an atrial septal defect. 

 

 Mitral Valve 

 No mitral stenosis, regurgitation or prolapse. 

 

 Aortic Valve 

 No aortic valve stenosis or regurgitation. 

 

 Tricuspid Valve 

 Mild tricuspid regurgitation. 

 

 Pulmonic Valve 

 Trace pulmonic regurgitation. 

 

 Pericardium 

 No pericardial effusion. 

 

 Aorta 

 Normal size aortic root and proximal ascending aorta. 

 

 CONCLUSIONS 

 This is a technically difficult study.  Echo contrast was administered.  There  

 is concentric LVH and normal systolic function enlarged left atrium no  

 significant abnormality on Doppler and no pericardial effusion 

 Previewed by:  

 Dr. Titus French MD 

 (Electronically Signed) 

 Final Date:      2022 08:01

## 2024-03-25 NOTE — CONS
CONSULTATION



CHIEF COMPLAINT:

Chest pain.



HISTORY OF PRESENT ILLNESS:

This is a 51-year-old gentleman with multiple coronary risk factors, including

hypertension, diabetes, dyslipidemia, presented to hospital yesterday complaining of

chest pain.  He describes it as moderate intensity precordial chest pressure that came

at rest with left arm radiation.  His blood pressures were extremely elevated.  The

patient became pain-free in the emergency room with medical therapy including nitrates

and optimal control of blood pressure.  His EKG showed sinus rhythm with poor R-wave

progression suggestive of prior anteroseptal myocardial infarction.  The patient had 3

sets of troponins that were elevated at 0.05, 0.05 and 0.05.  Hemoglobin is normal at

13.4, creatinine is 0.8.  The patient's clinical presentation is consistent with acute

non ST segment MI.  I advised the patient to undergo cardiac catheterization for

further evaluation.  The patient had an episode of TIA within the last 1 year.  He is

unsure as to which hospital he was taken care of.  He is from out of town and is

visiting [place].  He states that he is complaint with his medical therapy.  I

advised the patient to undergo cardiac catheterization for further evaluation.  I will

obtain a 2D echo.  Patient understands risks, benefits and alternatives including the

risk of stroke.



PAST MEDICAL HISTORY:

Significant for hypertension, diabetes, dyslipidemia, and TIA.



MEDICATIONS:

Medications at home include: Norvasc 10 daily, Lipitor 40 daily, Zetia 10 daily, Coreg

25 b.i.d., Imdur 30 daily, hydrochlorothiazide, Hyzaar, Lasix 40 b.i.d., hydralazine 25

t.i.d., insulin, and Glucophage.



ALLERGY:

SHELLFISH.



FAMILY HISTORY:

Negative for premature coronary artery disease.



SOCIAL HISTORY:

He denies current smoking, EtOH abuse or drug abuse.



REVIEW OF SYSTEMS:

14 out of 14 review of systems has been performed.  Pertinents are as documented.



EXAM:

Patient is afebrile. Heart rate is 90 beats per minute, blood pressure is 130/74,

respirations 18, O2 saturation is 96% on room air.  There is no jugular venous

distention.  Carotid upstroke is normal.  There is no bruit.  Chest exam reveals good

air entry bilaterally. Heart exam reveals first and second heart sounds.  No gallop.

No murmur.  Abdomen is soft, nontender. Examination of extremities did not reveal any

edema.  Peripheral pulses are palpable.



LAB:

Show a hemoglobin of 13.4, platelet count of 178, potassium is 4, creatinine is 0.89.

Troponins are mildly elevated.



ASSESSMENT:

1. Acute non ST segment elevation myocardial infarction.

2. Severe uncontrolled hypertension.

3. History of transient ischemic attack.

4. Non-insulin-dependent diabetes.



PLAN:

The patient will undergo cardiac catheterization for further evaluation.  He had been

explained risks, benefits and alternatives.





MMODL / IJN: [id number] / Job#: [id number] PACU and home